# Patient Record
Sex: FEMALE | Race: WHITE | NOT HISPANIC OR LATINO | Employment: STUDENT | URBAN - METROPOLITAN AREA
[De-identification: names, ages, dates, MRNs, and addresses within clinical notes are randomized per-mention and may not be internally consistent; named-entity substitution may affect disease eponyms.]

---
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
## 2021-08-06 ENCOUNTER — OFFICE VISIT (OUTPATIENT)
Dept: OBSTETRICS AND GYNECOLOGY | Facility: CLINIC | Age: 21
End: 2021-08-06
Payer: COMMERCIAL

## 2021-08-06 VITALS
HEART RATE: 72 BPM | WEIGHT: 111 LBS | HEIGHT: 62 IN | SYSTOLIC BLOOD PRESSURE: 110 MMHG | DIASTOLIC BLOOD PRESSURE: 68 MMHG | BODY MASS INDEX: 20.43 KG/M2

## 2021-08-06 DIAGNOSIS — R30.0 DYSURIA: Primary | ICD-10-CM

## 2021-08-06 DIAGNOSIS — N76.0 ACUTE VAGINITIS: ICD-10-CM

## 2021-08-06 LAB
BILIRUB SERPL-MCNC: NORMAL MG/DL
BLOOD URINE, POC: NORMAL
CLARITY, POC UA: CLEAR
COLOR, POC UA: YELLOW
GLUCOSE UR QL STRIP: NORMAL
KETONES UR QL STRIP: NORMAL
LEUKOCYTE ESTERASE URINE, POC: NORMAL
NITRITE, POC UA: NORMAL
PH, POC UA: 5.5
PROTEIN, POC: NORMAL
SPECIFIC GRAVITY, POC UA: 1.03
UROBILINOGEN, POC UA: 0.2

## 2021-08-06 PROCEDURE — 99213 OFFICE O/P EST LOW 20 MIN: CPT | Mod: 25,S$GLB,, | Performed by: OBSTETRICS & GYNECOLOGY

## 2021-08-06 PROCEDURE — 81002 POCT URINE DIPSTICK WITHOUT MICROSCOPE: ICD-10-PCS | Mod: S$GLB,,, | Performed by: OBSTETRICS & GYNECOLOGY

## 2021-08-06 PROCEDURE — 87210 PR  SMEAR,STAIN,WET MNT,INTERP: ICD-10-PCS | Mod: QW,S$GLB,, | Performed by: OBSTETRICS & GYNECOLOGY

## 2021-08-06 PROCEDURE — 3078F DIAST BP <80 MM HG: CPT | Mod: CPTII,S$GLB,, | Performed by: OBSTETRICS & GYNECOLOGY

## 2021-08-06 PROCEDURE — 81002 URINALYSIS NONAUTO W/O SCOPE: CPT | Mod: S$GLB,,, | Performed by: OBSTETRICS & GYNECOLOGY

## 2021-08-06 PROCEDURE — 99213 PR OFFICE/OUTPT VISIT, EST, LEVL III, 20-29 MIN: ICD-10-PCS | Mod: 25,S$GLB,, | Performed by: OBSTETRICS & GYNECOLOGY

## 2021-08-06 PROCEDURE — 1159F PR MEDICATION LIST DOCUMENTED IN MEDICAL RECORD: ICD-10-PCS | Mod: CPTII,S$GLB,, | Performed by: OBSTETRICS & GYNECOLOGY

## 2021-08-06 PROCEDURE — 3074F PR MOST RECENT SYSTOLIC BLOOD PRESSURE < 130 MM HG: ICD-10-PCS | Mod: CPTII,S$GLB,, | Performed by: OBSTETRICS & GYNECOLOGY

## 2021-08-06 PROCEDURE — 3008F PR BODY MASS INDEX (BMI) DOCUMENTED: ICD-10-PCS | Mod: CPTII,S$GLB,, | Performed by: OBSTETRICS & GYNECOLOGY

## 2021-08-06 PROCEDURE — 1159F MED LIST DOCD IN RCRD: CPT | Mod: CPTII,S$GLB,, | Performed by: OBSTETRICS & GYNECOLOGY

## 2021-08-06 PROCEDURE — 3074F SYST BP LT 130 MM HG: CPT | Mod: CPTII,S$GLB,, | Performed by: OBSTETRICS & GYNECOLOGY

## 2021-08-06 PROCEDURE — 3078F PR MOST RECENT DIASTOLIC BLOOD PRESSURE < 80 MM HG: ICD-10-PCS | Mod: CPTII,S$GLB,, | Performed by: OBSTETRICS & GYNECOLOGY

## 2021-08-06 PROCEDURE — 87210 SMEAR WET MOUNT SALINE/INK: CPT | Mod: QW,S$GLB,, | Performed by: OBSTETRICS & GYNECOLOGY

## 2021-08-06 PROCEDURE — 3008F BODY MASS INDEX DOCD: CPT | Mod: CPTII,S$GLB,, | Performed by: OBSTETRICS & GYNECOLOGY

## 2021-08-06 RX ORDER — CLOTRIMAZOLE AND BETAMETHASONE DIPROPIONATE 10; .64 MG/G; MG/G
CREAM TOPICAL
Qty: 15 G | Refills: 1 | Status: SHIPPED | OUTPATIENT
Start: 2021-08-06 | End: 2022-08-06

## 2021-08-06 RX ORDER — FLUCONAZOLE 150 MG/1
150 TABLET ORAL DAILY
Qty: 1 TABLET | Refills: 0 | Status: SHIPPED | OUTPATIENT
Start: 2021-08-06 | End: 2021-08-07

## 2022-06-16 ENCOUNTER — TELEPHONE (OUTPATIENT)
Dept: OBSTETRICS AND GYNECOLOGY | Facility: CLINIC | Age: 22
End: 2022-06-16
Payer: COMMERCIAL

## 2022-06-16 NOTE — TELEPHONE ENCOUNTER
Patient's call was returned, she thinks she has vaginitis and would like something called out, patient is out of town and has not been seen since 2021. Explained to patient that she would need to be seen for this since it has been since last year. The provider would not be able to call in any medications. Encouraged patient to seek treatment at an Urgent Care also offered her an appt with Eliza Oneil upon her return home. Patient said she would call if she still had symptoms.         melvi

## 2022-06-16 NOTE — TELEPHONE ENCOUNTER
----- Message from Vikash Hardwick sent at 6/16/2022 11:20 AM CDT -----  Contact: self  Patient called and asked if she can get something called out for a UTI. Please call [phone]

## 2024-02-22 ENCOUNTER — TELEPHONE (OUTPATIENT)
Dept: OBSTETRICS AND GYNECOLOGY | Facility: CLINIC | Age: 24
End: 2024-02-22
Payer: COMMERCIAL

## 2024-02-22 NOTE — TELEPHONE ENCOUNTER
I spoke with pt. She just wanted to make sure that her appointment for Monday was scheduled correctly. She wants a pap smear and a new IUD. I told her that we can do the pap smear, but we will have to order and IUD and get her scheduled with Dr. Kapoor for that procedure. I advised her that we can discuss all of that with her on Monday at her appointment. She understood.

## 2024-02-22 NOTE — TELEPHONE ENCOUNTER
----- Message from Azucena Polanco sent at 2/22/2024 11:12 AM CST -----  Contact: Madhuri  .Patient is calling to speak with the nurse regarding questions and concerns  . Reports need to discuss visit . Please give patient a call back at   [phone]

## 2024-10-01 ENCOUNTER — OFFICE VISIT (OUTPATIENT)
Dept: OBSTETRICS AND GYNECOLOGY | Facility: CLINIC | Age: 24
End: 2024-10-01
Payer: COMMERCIAL

## 2024-10-01 VITALS
HEIGHT: 62 IN | HEART RATE: 70 BPM | WEIGHT: 129.19 LBS | SYSTOLIC BLOOD PRESSURE: 115 MMHG | BODY MASS INDEX: 23.77 KG/M2 | DIASTOLIC BLOOD PRESSURE: 74 MMHG

## 2024-10-01 DIAGNOSIS — Z11.3 SCREENING EXAMINATION FOR STD (SEXUALLY TRANSMITTED DISEASE): Primary | ICD-10-CM

## 2024-10-01 DIAGNOSIS — Z01.419 ENCOUNTER FOR WELL WOMAN EXAM WITH ROUTINE GYNECOLOGICAL EXAM: ICD-10-CM

## 2024-10-01 PROCEDURE — 1159F MED LIST DOCD IN RCRD: CPT | Mod: CPTII,,, | Performed by: OBSTETRICS & GYNECOLOGY

## 2024-10-01 PROCEDURE — 99395 PREV VISIT EST AGE 18-39: CPT | Mod: S$PBB,,, | Performed by: OBSTETRICS & GYNECOLOGY

## 2024-10-01 PROCEDURE — 3074F SYST BP LT 130 MM HG: CPT | Mod: CPTII,,, | Performed by: OBSTETRICS & GYNECOLOGY

## 2024-10-01 PROCEDURE — 3008F BODY MASS INDEX DOCD: CPT | Mod: CPTII,,, | Performed by: OBSTETRICS & GYNECOLOGY

## 2024-10-01 PROCEDURE — 3078F DIAST BP <80 MM HG: CPT | Mod: CPTII,,, | Performed by: OBSTETRICS & GYNECOLOGY

## 2024-10-01 RX ORDER — SERTRALINE HCL 100 MG
TABLET ORAL
COMMUNITY
Start: 2023-06-01

## 2024-10-01 NOTE — PROGRESS NOTES
Subjective:       Patient ID: Madhuri Hall is a 24 y.o. female.    Chief Complaint:  Well Woman      History of Present Illness  She is doing well.  No new health issues,  No abnormal bleeding, No GI or Gu concerns,  No dyspareunia  Here for her annual   reg cycle  her cycles every other time are heavy with cramps.  Recent labs looked good   She is interested in an IUD   On zoloft    .   Gynecologic Exam  The patient's pertinent negatives include no genital itching, genital lesions, genital odor, genital rash, missed menses, pelvic pain, vaginal bleeding or vaginal discharge. She is not pregnant. Pertinent negatives include no abdominal pain, back pain or rash. There is no history of an abdominal surgery, a  section, an ectopic pregnancy, endometriosis, a gynecological surgery, herpes simplex, menorrhagia, metrorrhagia, miscarriage, ovarian cysts, perineal abscess, PID, an STD, a terminated pregnancy or vaginosis.         GYN & OB History  Patient's last menstrual period was 2024.     Date of Last Pap: 10/4/2024    OB History    Para Term  AB Living   0 0 0 0 0 0   SAB IAB Ectopic Multiple Live Births   0 0 0 0 0       Review of Systems  Review of Systems   Constitutional:  Negative for activity change.   Eyes:  Negative for visual disturbance.   Respiratory:  Negative for shortness of breath.    Cardiovascular:  Negative for chest pain.   Gastrointestinal:  Negative for abdominal pain.   Genitourinary:  Negative for menorrhagia, missed menses, pelvic pain, vaginal bleeding and vaginal discharge.        No abnormal vaginal bleeding   Musculoskeletal:  Negative for back pain.   Integumentary:  Negative for rash and breast mass.   Neurological:  Negative for numbness.   Psychiatric/Behavioral:          No mood disturbance or changes    Breast: Negative for mass            Objective:    Physical Exam:   Constitutional: She is oriented to person, place, and time. She appears  well-developed. She is cooperative.    HENT:   Head: Normocephalic.     Neck: Trachea normal. No thyromegaly present.    Cardiovascular:  Normal rate, regular rhythm and normal heart sounds.             Pulmonary/Chest: Effort normal and breath sounds normal.   Not preformed        Abdominal: Soft. There is no abdominal tenderness. There is no rebound and no guarding.     Genitourinary:    Vagina and uterus normal.      Pelvic exam was performed with patient supine.     Labial bartholins normal.There is no lesion on the right labia. There is no lesion on the left labia. Cervix is normal. Right adnexum displays no mass and no tenderness. Left adnexum displays no mass and no tenderness. Cervix exhibits no discharge. Uterus is not enlarged and not tender.              Lymphadenopathy:        Head (right side): No submental and no submandibular adenopathy present.        Head (left side): No submental and no submandibular adenopathy present.     She has no cervical adenopathy.    Neurological: She is alert and oriented to person, place, and time.    Skin: Skin is warm.    Psychiatric: She has a normal mood and affect. Her speech is normal and behavior is normal. Thought content normal.          Assessment:        1. Screening examination for STD (sexually transmitted disease)    2. Encounter for well woman exam with routine gynecological exam                 Plan:             Pap preformed as well as gyn probe     discussed a possible kyleena.  Will try slynd first    she will see if she likes this    discussed will return for her annual     Pt is aware we call all results. If she does not hear from our office regarding her result within a week of having a study or procedure performed she is to call the office so that we can research the result for her.  Birth control discussed  Chaperone was present

## 2024-10-03 LAB
CHLAMYDIA: NEGATIVE
GONORRHEA: NEGATIVE
SOURCE: NORMAL

## 2024-10-04 LAB — Lab: NORMAL

## 2024-10-07 NOTE — PROGRESS NOTES
Please call   I want to let you know that your pap smear is normal   I want to let you know that your pap smear is normal.  Also, your cervical swab was negative.  Please let me know if you need anything   Sincerely,  Dr. Kapoor

## 2024-10-08 DIAGNOSIS — Z30.9 ENCOUNTER FOR CONTRACEPTIVE MANAGEMENT, UNSPECIFIED TYPE: Primary | ICD-10-CM

## 2024-10-09 ENCOUNTER — PATIENT MESSAGE (OUTPATIENT)
Dept: OBSTETRICS AND GYNECOLOGY | Facility: CLINIC | Age: 24
End: 2024-10-09
Payer: COMMERCIAL

## 2024-11-01 ENCOUNTER — PROCEDURE VISIT (OUTPATIENT)
Dept: OBSTETRICS AND GYNECOLOGY | Facility: CLINIC | Age: 24
End: 2024-11-01
Payer: COMMERCIAL

## 2024-11-01 ENCOUNTER — TELEPHONE (OUTPATIENT)
Dept: OBSTETRICS AND GYNECOLOGY | Facility: CLINIC | Age: 24
End: 2024-11-01

## 2024-11-01 VITALS
HEART RATE: 78 BPM | BODY MASS INDEX: 23.96 KG/M2 | SYSTOLIC BLOOD PRESSURE: 115 MMHG | WEIGHT: 131 LBS | DIASTOLIC BLOOD PRESSURE: 73 MMHG

## 2024-11-01 DIAGNOSIS — R63.5 WEIGHT GAIN: ICD-10-CM

## 2024-11-01 DIAGNOSIS — Z30.9 ENCOUNTER FOR CONTRACEPTIVE MANAGEMENT, UNSPECIFIED TYPE: Primary | ICD-10-CM

## 2024-11-01 LAB
B-HCG UR QL: NEGATIVE
CTP QC/QA: YES

## 2024-11-01 RX ORDER — TIRZEPATIDE 5 MG/.5ML
INJECTION, SOLUTION SUBCUTANEOUS
Qty: 4 PEN | Refills: 12 | Status: SHIPPED | OUTPATIENT
Start: 2024-11-01

## 2024-12-20 ENCOUNTER — PATIENT MESSAGE (OUTPATIENT)
Dept: OBSTETRICS AND GYNECOLOGY | Facility: CLINIC | Age: 24
End: 2024-12-20

## 2025-01-13 ENCOUNTER — OFFICE VISIT (OUTPATIENT)
Dept: OBSTETRICS AND GYNECOLOGY | Facility: CLINIC | Age: 25
End: 2025-01-13
Payer: COMMERCIAL

## 2025-01-13 ENCOUNTER — PATIENT MESSAGE (OUTPATIENT)
Dept: OBSTETRICS AND GYNECOLOGY | Facility: CLINIC | Age: 25
End: 2025-01-13

## 2025-01-13 ENCOUNTER — PROCEDURE VISIT (OUTPATIENT)
Dept: OBSTETRICS AND GYNECOLOGY | Facility: CLINIC | Age: 25
End: 2025-01-13
Payer: COMMERCIAL

## 2025-01-13 VITALS
DIASTOLIC BLOOD PRESSURE: 73 MMHG | BODY MASS INDEX: 20.89 KG/M2 | HEART RATE: 74 BPM | WEIGHT: 114.19 LBS | SYSTOLIC BLOOD PRESSURE: 107 MMHG

## 2025-01-13 DIAGNOSIS — R63.5 WEIGHT GAIN: Primary | ICD-10-CM

## 2025-01-13 DIAGNOSIS — Z30.431 IUD CHECK UP: ICD-10-CM

## 2025-01-13 PROCEDURE — 99213 OFFICE O/P EST LOW 20 MIN: CPT | Mod: 25,S$PBB,, | Performed by: OBSTETRICS & GYNECOLOGY

## 2025-01-13 PROCEDURE — 3008F BODY MASS INDEX DOCD: CPT | Mod: CPTII,,, | Performed by: OBSTETRICS & GYNECOLOGY

## 2025-01-13 PROCEDURE — 1159F MED LIST DOCD IN RCRD: CPT | Mod: CPTII,,, | Performed by: OBSTETRICS & GYNECOLOGY

## 2025-01-13 PROCEDURE — 3078F DIAST BP <80 MM HG: CPT | Mod: CPTII,,, | Performed by: OBSTETRICS & GYNECOLOGY

## 2025-01-13 PROCEDURE — 76830 TRANSVAGINAL US NON-OB: CPT | Mod: 26,S$PBB,, | Performed by: OBSTETRICS & GYNECOLOGY

## 2025-01-13 PROCEDURE — 3074F SYST BP LT 130 MM HG: CPT | Mod: CPTII,,, | Performed by: OBSTETRICS & GYNECOLOGY

## 2025-01-13 RX ORDER — TIRZEPATIDE 2.5 MG/.5ML
INJECTION, SOLUTION SUBCUTANEOUS
Qty: 4 PEN | Refills: 0 | Status: SHIPPED | OUTPATIENT
Start: 2025-01-13

## 2025-01-13 RX ORDER — SERTRALINE HYDROCHLORIDE 100 MG/1
100 TABLET, FILM COATED ORAL DAILY
Qty: 90 TABLET | Refills: 2 | Status: SHIPPED | OUTPATIENT
Start: 2025-01-13 | End: 2026-01-13

## 2025-01-13 NOTE — PROGRESS NOTES
Subjective:       Patient ID: Madhuri Hall is a 24 y.o. female.    Chief Complaint:  IUD Check (Patient c/o bleeding/spotting more often than prior to iud insertion. She is feeling bladder pressure as well. She c/o having cramping as well. She would like to discuss other options. )      History of Present Illness  She is here to evaluate the IUD.   She recently had  UTI sx and the sx went away and then came back.  She did not look for the IUD string.  She is having spotting and bleeding more than not. She has some cramps.she has not noted benefit from the IUD   it was placed    She is going through a break up   she is on zoloft 75     she has done well with the zepbound  she has stayed on the 2.5 mg  she has some nause       GYN & OB History  No LMP recorded (lmp unknown).     Date of Last Pap: 10/4/2024    OB History    Para Term  AB Living   0 0 0 0 0 0   SAB IAB Ectopic Multiple Live Births   0 0 0 0 0       Review of Systems  Review of Systems   Genitourinary:  Positive for dysmenorrhea.             Objective:    Physical Exam       Assessment:        1. Weight gain    2. IUD check up                 Plan:         She was called by me on  and is doing well she was pleased to know the IUD was positioned correctly   I have encouraged her to try to give it 4 months as long as her sx are manageable    she agrees and will let me know    Pap discussed will return for her annual     Pt is aware we call all results. If she does not hear from our office regarding her result within a week of having a study or procedure performed she is to call the office so that we can research the result for her.  Discussed follow up.  She is to rtn if her symptoms do not resolve or if persist  Chaperone was present